# Patient Record
Sex: FEMALE | Race: WHITE | NOT HISPANIC OR LATINO | Employment: FULL TIME | URBAN - METROPOLITAN AREA
[De-identification: names, ages, dates, MRNs, and addresses within clinical notes are randomized per-mention and may not be internally consistent; named-entity substitution may affect disease eponyms.]

---

## 2019-12-03 ENCOUNTER — OFFICE VISIT (OUTPATIENT)
Dept: BEHAVIORAL HEALTH | Facility: CLINIC | Age: 49
End: 2019-12-03
Payer: COMMERCIAL

## 2019-12-03 VITALS
DIASTOLIC BLOOD PRESSURE: 100 MMHG | WEIGHT: 179 LBS | HEIGHT: 61 IN | BODY MASS INDEX: 33.79 KG/M2 | HEART RATE: 94 BPM | SYSTOLIC BLOOD PRESSURE: 107 MMHG

## 2019-12-03 DIAGNOSIS — F41.1 GENERALIZED ANXIETY DISORDER: ICD-10-CM

## 2019-12-03 DIAGNOSIS — G47.9 SLEEP DISORDER: ICD-10-CM

## 2019-12-03 PROBLEM — E03.9 HYPOTHYROIDISM: Status: ACTIVE | Noted: 2018-08-09

## 2019-12-03 PROBLEM — I10 HTN (HYPERTENSION): Status: ACTIVE | Noted: 2018-08-09

## 2019-12-03 PROCEDURE — 99204 OFFICE O/P NEW MOD 45 MIN: CPT | Mod: GC | Performed by: PSYCHIATRY & NEUROLOGY

## 2019-12-03 RX ORDER — QUETIAPINE FUMARATE 25 MG/1
25-50 TABLET, FILM COATED ORAL NIGHTLY PRN
Qty: 60 TAB | Refills: 2 | Status: SHIPPED | OUTPATIENT
Start: 2019-12-03 | End: 2020-03-10 | Stop reason: SDUPTHER

## 2019-12-03 RX ORDER — LEVOTHYROXINE SODIUM 0.05 MG/1
TABLET ORAL
Refills: 1 | COMMUNITY
Start: 2019-11-10 | End: 2020-10-14

## 2019-12-03 RX ORDER — HYDROXYZINE 50 MG/1
25-50 TABLET, FILM COATED ORAL 3 TIMES DAILY PRN
Qty: 30 TAB | Refills: 2 | Status: SHIPPED | OUTPATIENT
Start: 2019-12-03 | End: 2020-03-10 | Stop reason: SINTOL

## 2019-12-03 RX ORDER — CITALOPRAM 20 MG/1
20 TABLET ORAL
Qty: 30 TAB | Refills: 2 | Status: SHIPPED | OUTPATIENT
Start: 2019-12-03 | End: 2020-03-10 | Stop reason: SDUPTHER

## 2019-12-03 RX ORDER — BENZONATATE 100 MG/1
CAPSULE ORAL
COMMUNITY
Start: 2019-02-22 | End: 2019-12-03

## 2019-12-03 RX ORDER — LISINOPRIL AND HYDROCHLOROTHIAZIDE 12.5; 1 MG/1; MG/1
TABLET ORAL
COMMUNITY
Start: 2018-09-21 | End: 2020-05-06

## 2019-12-03 RX ORDER — LIOTHYRONINE SODIUM 5 UG/1
5 TABLET ORAL
Refills: 1 | COMMUNITY
Start: 2019-11-15

## 2019-12-03 RX ORDER — QUETIAPINE FUMARATE 25 MG/1
TABLET, FILM COATED ORAL
COMMUNITY
Start: 2019-04-11 | End: 2019-12-03 | Stop reason: SDUPTHER

## 2019-12-03 RX ORDER — LIOTHYRONINE SODIUM 5 UG/1
TABLET ORAL
COMMUNITY
Start: 2018-09-24 | End: 2019-12-03

## 2019-12-03 RX ORDER — CITALOPRAM 20 MG/1
20 TABLET ORAL
Refills: 0 | COMMUNITY
Start: 2019-11-12 | End: 2019-12-03 | Stop reason: SDUPTHER

## 2019-12-03 RX ORDER — CLONAZEPAM 0.5 MG/1
TABLET ORAL
COMMUNITY
End: 2020-03-10

## 2019-12-03 RX ORDER — MELOXICAM 7.5 MG/1
TABLET ORAL
COMMUNITY
Start: 2019-03-01 | End: 2020-10-14

## 2019-12-03 RX ORDER — GLUCOSAMINE HCL 500 MG
TABLET ORAL
COMMUNITY
End: 2019-12-03

## 2019-12-03 RX ORDER — ERGOCALCIFEROL 1.25 MG/1
CAPSULE ORAL
Refills: 1 | COMMUNITY
Start: 2019-09-24

## 2019-12-03 ASSESSMENT — PATIENT HEALTH QUESTIONNAIRE - PHQ9: CLINICAL INTERPRETATION OF PHQ2 SCORE: 0

## 2019-12-03 NOTE — PROGRESS NOTES
"  PSYCHIATRIC Evaluation:    Requesting Provider: Benja Torres D.O.  Supervising Physician: Dr. Myles  Information Obtained From: Patient    Chief Complaint:   Anxiety and sleep    ID: 49 y.o. Indian female with hx of JUANITA, panic, depression, sleep disorder and HTN, hypothyroid     HPI:   JUANITA: stable, pt states no significant daily discomfort from anxiety at this time. Pt has hx of panic attacks. Notes she has spikes in anxiety 1-2 times per week which she manages with deep breathing, walking, and occasional old PRN clonazepam Rx. Pt amenable to continue citalopram and not restarting therapy at this time. Pt amenable to trial PRN hydroxyzine for anxiety spikes. RTC 3 months, sooner if needed.    Sleep disturbance: pt has been on sleep aid for many yrs. Currently stable on Quetiapine 50 mg PO HS. Discussed risk/benefit/side effects. Pt states she would like to minimize medication and is working to lose weight. We agreed to taper and discontinue over time with referral to sleep CBT-I.         On psych ROS, pt denies depressive symptoms, denies manic symptoms, denies psychotic symptoms including AH / VH, denies OCD symptoms, denies restrictive eating or purging and denies trauma related symptoms; denies SI/HI     Depression Screen (PHQ-2/PHQ-9) 12/3/2019   PHQ-2 Total Score 0       Interpretation of PHQ-9 Total Score   Score Severity   1-4 No Depression   5-9 Mild Depression   10-14 Moderate Depression   15-19 Moderately Severe Depression   20-27 Severe Depression    Medical Review of Systems: as reported by pt. All systems reviewed.   General: sleep see HPI; states appetite and energy doing well  Cardio: hailey chest pain  Resp: denies sob  GI: denies disturbance  Psych See HPI    Medical Conditions: No past medical history on file.  TBIs: denies  SZs: denies  Strokes: denies  Thyroid: on Rx  Diabetes: denies  Cardiovascular disease: denies          Objective:  Blood pressure 107/100, pulse 94, height 1.549 m (5' 1\"), " "weight 81.2 kg (179 lb).           MSE :     Appearance: over weight  female; appears stated age; clean   Behavior/Motor: calm, cooperative, pleasant, engaged. good eye contact.  No tics. No mannerisms.   Speech : normal rate, normal volume, normal tone, no slurring of speech and speech is clear and understandable; with appropriate prosody, and inflection   Language: English with fluent and good vocabulary    Mood: \"anxious\"   Affect: euthymic, mood congruent and full range of affect   Thought Process: linear, clear, coherent; without loosening of association; without thought blocking; goal-oriented   Thought Content: no delusions, paranoia, obsessions, phobias, or overvalued ideas noted; denies intrusive thoughts   Perceptions: denies a/v hallucinations; not seen to respond to internal stimuli; not internally preoccupied   Attention/Concentration: intact to conversation; did not require reorientation to questions   Memory: no gross deficits   Orientation: alert and grossly oriented to person, place, situation, and time    Neurological: Deferred   Fund of Knowledge: appropriate to interview based on syntax   Insight/Judgment: good / good based on ability to discuss health   SI/HI: Reliably denies suicidal and homicidal ideations       Past Psychiatric Hx:   Psychiatric hospitalizations: Denies  Past suicide attempts: Denies  Previous psychiatric medications:  Ambien: felt odd on Rx  Seroquel: effective for sleep  Citalopram up to 40 mg; pt denies side effects and desires lowest effective does  Zoloft: ineffective    Family Psychiatric Hx:  NIMA: dad and brother Etoh  Denies psychosis, suicide, bipolar, depression , anxiety in family    Social Hx:  Moves often for work  Moved to [place] June 2019  Born/raised [place], [place] (family still there)  2 brothers  Works in finance  Denies  hx  Denies disability  Financies sufficient at this time  Housing stable  Legal/Violence: Patient reports no pending " legal issues    Access to Firearms: No    Drug/Alcohol/Tobacco Hx:   Drugs: Patient denies the use of the following drugs:, meth, heroin, cocaine, marijuana, LSD and shrooms   Alcohol: Patient denies the use of alcohol   Tobacco: Patient denies the use of tobacco products    MEDICAL Hx: labs, MARS, medications, etc were reviewed. Findings of potential interest to psychiatry are noted below:    No past medical history on file.      Medications:   Current Outpatient Medications   Medication Sig Dispense Refill   • clonazePAM (KLONOPIN) 0.5 MG Tab Take  by mouth.     • levothyroxine (SYNTHROID) 50 MCG Tab TAKE 1 TABLET BY ORAL ROUTE ON MONDAY -FRIDAY AND 1.5 TAB ON SATURDAY AND SUNDAY  1   • liothyronine (CYTOMEL) 5 MCG Tab Take 5 mcg by mouth every day.  1   • lisinopril-hydrochlorothiazide (PRINZIDE) 10-12.5 MG per tablet Take  by mouth.     • meloxicam (MOBIC) 7.5 MG Tab Take  by mouth.     • QUEtiapine (SEROQUEL) 25 MG Tab Take 1-2 Tabs by mouth at bedtime as needed (for sleep). 60 Tab 2   • citalopram (CELEXA) 20 MG Tab Take 1 Tab by mouth every day. 30 Tab 2   • hydrOXYzine HCl (ATARAX) 50 MG Tab Take 0.5-1 Tabs by mouth 3 times a day as needed for Itching or Anxiety. 30 Tab 2   • ergocalciferol (DRISDOL) 43329 UNIT capsule TAKE ONE CAPSULE BY MOUTH ONE TIME PER WEEK  1     No current facility-administered medications for this visit.        Allergies:   Patient has no known allergies.    Labs:   none  ECG: ECG not performed for this encounter    Imaging: No recent head, neck, or brain imaging      ASSESSMENT:  Pt is a 49 y.o. [place] female with hx of JUANITA, panic, depression, sleep disorder and HTN, hypothyroid seen in new patient evaluation. Pt has JUANITA in remission and stable panic disorder. Pt amenable to continue citalopram 20 mg po daily. Pt has old clonazepam Rx and will trial hydroxyzine PRN for panic. Pt chronic sleep disturbance for which she takes Quetiapine 50 mg PO daily; plan to taper/discontinue and  referral to sleep CBTI-.    Risk Assessment:  Acute danger to self: Low, in follow up care, no active SI or intent/plans.  Chronic danger to self: elevated due to psychiatric illness.  Danger to others: denies homicidal ideations  Grave Disability: not applicable  Guns: denies access  Emergency plan reviewed: call 911 or report to ED if suicidal. provided suicide/crisis line number    DDX/Plan:  # JUANITA in remission  Hx of panic attacks; has old clonazepam Rx  Trial hydroxyzine 25-50 mg PO TID PRN for panic  Continue citalopram 20 mg PO daily  Pt not interested in therapy at this time    #sleep disorder  Quetiapine 25-50 mg PO HS PRN for sleep  Has been taking consistently for many yrs; will start to taper and trial nights without sleep aid to see how sleep is currently  Discussed sleep aid as short term only, plan to taper and discontinue  Information for Dr. Renner for sleep CBT-I      -Discussed risks, benefits, side effects, and alternatives of recommended treatment with patient.  - f/u in 3 month(s) or sooner if needed          This note was created using voice recognition software (Dragon). The accuracy of the dictation is limited by the abilities of the software. I have reviewed the note prior to signing; however, some errors in grammar and context are still possible. If you have any questions related to this note, please do not hesitate to contact our office.     Discussed case, assessment, and plan with my attending, Dr. Myles, who was able to personally see and evaluate the patient.

## 2020-03-10 ENCOUNTER — OFFICE VISIT (OUTPATIENT)
Dept: BEHAVIORAL HEALTH | Facility: CLINIC | Age: 50
End: 2020-03-10
Payer: COMMERCIAL

## 2020-03-10 DIAGNOSIS — G47.9 SLEEP DISORDER: ICD-10-CM

## 2020-03-10 DIAGNOSIS — F41.1 GENERALIZED ANXIETY DISORDER: ICD-10-CM

## 2020-03-10 PROBLEM — Z86.59 PERSONAL HISTORY OF OTHER MENTAL AND BEHAVIORAL DISORDERS: Status: ACTIVE | Noted: 2017-02-06

## 2020-03-10 PROBLEM — E55.9 VITAMIN D DEFICIENCY: Status: ACTIVE | Noted: 2017-02-06

## 2020-03-10 PROCEDURE — 96127 BRIEF EMOTIONAL/BEHAV ASSMT: CPT | Performed by: PSYCHIATRY & NEUROLOGY

## 2020-03-10 RX ORDER — CITALOPRAM 20 MG/1
30 TABLET ORAL
Qty: 45 TAB | Refills: 2 | Status: SHIPPED | OUTPATIENT
Start: 2020-03-10 | End: 2020-05-05 | Stop reason: SDUPTHER

## 2020-03-10 RX ORDER — ACETAMINOPHEN 160 MG
TABLET,DISINTEGRATING ORAL
COMMUNITY
Start: 2017-01-19

## 2020-03-10 RX ORDER — QUETIAPINE FUMARATE 25 MG/1
25 TABLET, FILM COATED ORAL NIGHTLY PRN
Qty: 30 TAB | Refills: 2 | Status: SHIPPED | OUTPATIENT
Start: 2020-03-10 | End: 2020-05-05 | Stop reason: SDUPTHER

## 2020-03-10 RX ORDER — CHLORAL HYDRATE 500 MG
CAPSULE ORAL
COMMUNITY
Start: 2017-01-19 | End: 2020-10-14

## 2020-03-10 RX ORDER — CLONAZEPAM 0.5 MG/1
.25-1 TABLET ORAL
Qty: 10 TAB | Refills: 1 | Status: SHIPPED
Start: 2020-03-10 | End: 2020-06-09 | Stop reason: SDUPTHER

## 2020-03-10 ASSESSMENT — PATIENT HEALTH QUESTIONNAIRE - PHQ9
SUM OF ALL RESPONSES TO PHQ QUESTIONS 1-9: 5
CLINICAL INTERPRETATION OF PHQ2 SCORE: 2
5. POOR APPETITE OR OVEREATING: 0 - NOT AT ALL

## 2020-03-10 NOTE — PROGRESS NOTES
[facility]  PSYCHIATRIC FOLLOW-UP NOTE    PCP: Benja Torres D.O.  Persons in attendance: Patient  Total face-to-face time: 25 minutes    REASON FOR VISIT/CHIEF COMPLAINT (as stated by Patient):  Follow up for JUANITA and sleep     HISTORY OF PRESENT ILLNESS:    ID: 49 y.o. Kisw[place] female with hx of JUANITA, panic, depression, sleep disorder and HTN, hypothyroid      Current psychotropics:  Citalopram 20 mg daily  Old Rx of clonazepam 0.5 mg tabs (out of medication)  Hydroxyzine 25-50 mg PRN (not effective too sedating)  Seroquel 25-50 mg PRN for sleep (pt taking 25 mg PO HS)    Pt notes worsening general anxiety which she relates to work stress. Pt notes that she had increased use of clonazepam to three times weekly over the last two months from previously using none for many months. Pt reports anxiety and anger spikes where she will become very frustrated and have difficulty utilizing her coping skills to calm herself (past therapy, none current). Pt working on behavioral activation of caring for dog which she is able to take to work which she feels is helping her stay calm at work. Pt reports appetite has been down secondary to anxiety. Pt amenable to use of PRN clonazepam and titration of citalopram for better control of anxiety, and continued tapering of Seroquel 25 mg PO HS PRN for sleep  after discussed risk/benefit/side effects both short and long term and black box warnings/metobolic risk and tardive dyskinesia risk. Pt not interested in restarting therapy at this time.      PSYCHOSOCIAL CHANGES SINCE PREVIOUS CONTACT:  Work stress: boss fired, trouble with person below her, workplace harassment problems  Got small, old dog which she walks 3 times daily and takes to work  Denies substance use (father had NIMA)  Lives alone      Depression Screen (PHQ-2/PHQ-9) 12/3/2019 3/10/2020   PHQ-2 Total Score 0 2   PHQ-9 Total Score - 5       Interpretation of PHQ-9 Total Score   Score Severity   1-4 No  "Depression   5-9 Mild Depression   10-14 Moderate Depression   15-19 Moderately Severe Depression   20-27 Severe Depression  RESPONSE TO TREATMENT:  Partial remission of symptoms with citalopram for anxiety  Benefit with PRN clonazepam for anxiety spikes  Benefit without AM grogginess with Seroquel 25 mg PO HS PRN for sleep  Hydroxyzine 25-50 mg PRN was too sedating    MEDICATION SIDE EFFECTS:   denies    REVIEW OF SYSTEMS:        General: see hpi for sleep; denies problems with appetite and energy  Cardio: did not endorse chest pain; notes palpitations during anxiety increase  Respiratory: denies cough  GI: did not endorse nausea/vomiting/diarrhea/constipation  Psych: see HPI; denies depression, psychosis, shelly, hypomania, illicit substance use, alcohol use, MJ use, nicotine use, denies SI/HI      PSYCHIATRIC EXAMINATION/MENTAL STATUS  Blood pressure 117/76, pulse 95, weight 80.1 kg (176 lb 9.6 oz).    Appearance: over weight  female; appears stated age; clean              Behavior/Motor: calm, cooperative, pleasant, engaged. good eye contact.  No tics. No mannerisms.              Speech : normal rate, normal volume, normal tone, no slurring of speech and speech is clear and understandable; with appropriate prosody, and inflection              Language: English with fluent and good vocabulary               Mood: \"okay\"              Affect: euthymic, mood congruent and full range of affect              Thought Process: linear, clear, coherent; without loosening of association; without thought blocking; goal-oriented              Thought Content: no delusions, paranoia, obsessions, phobias, or overvalued ideas noted; denies intrusive thoughts              Perceptions: denies a/v hallucinations; not seen to respond to internal stimuli; not internally preoccupied              Attention/Concentration: intact to conversation; did not require reorientation to questions              Memory: no gross deficits      "         Orientation: alert and oriented to person, place, situation, and time               Neurological: Deferred              Fund of Knowledge: appropriate to interview based on syntax              Insight/Judgment: good / good based on ability to discuss health  SI/HI: Reliably denies suicidal and homicidal ideations     Medical Records/Labs/Diagnostic Tests Reviewed: none since last visit    Medical Records/Labs/Diagnostic Tests Ordered: none      ASSESSMENT:  Pt is a 49 y.o. Romansh female with hx of JUANITA, panic, depression, sleep disorder and HTN, hypothyroid seen in new patient evaluation. Pt has JUANITA active again with some panic symptoms. Pt amenable to increase citalopram 30 mg po daily. Use of Prn clonazepam (past prior benefit; no concern for misuse). Pt chronic sleep disturbance for which she takes Quetiapine 25 mg PO daily; plan to taper/discontinue and utilizing sleep hygiene.     Risk Assessment:  Acute danger to self: Low, in follow up care, no active SI or intent/plans.  Chronic danger to self: elevated due to psychiatric illness.  Danger to others: denies homicidal ideations  Grave Disability: not applicable  Guns: denies access  Emergency plan reviewed: call 911 or report to ED if suicidal. provided suicide/crisis line number    DDX/Plan:  # JUANITA in remission  Start: clonazepam 0.25-1 mg PO PRN for anxiety, #10 0.5 mg tabs for 30 days with 1 refill (has not filled controlled substance in [place],  empty)  Controlled substance contract signed   Discontinue due to ineffective hydroxyzine 25-50 mg PO TID PRN for panic  Increase citalopram 30 mg PO daily  Pt not interested in therapy at this time; has done therapy in the past    #sleep disorder  Quetiapine 25 mg PO HS PRN for sleep  Has been taking consistently for many yrs; will start to taper and trial nights without sleep aid to see how sleep is currently  Discussed sleep aid as short term only, plan to taper and discontinue  Information for   [facility] for sleep CBT-I; pt is not interested at this time  Pt improving sleep hygiene      -Discussed risks, benefits, side effects, and alternatives of recommended treatment with patient.  - f/u in 1.5-2 month(s) or sooner if needed  Discussed transition of care and physician parental leave          This note was created using voice recognition software (Dragon). The accuracy of the dictation is limited by the abilities of the software. I have reviewed the note prior to signing; however, some errors in grammar and context are still possible. If you have any questions related to this note, please do not hesitate to contact our office.     Discussed case, assessment, and plan with my attending, Dr. Myles, who was NOT able to personally see and evaluate the patient.

## 2020-03-13 VITALS
SYSTOLIC BLOOD PRESSURE: 117 MMHG | BODY MASS INDEX: 33.34 KG/M2 | HEART RATE: 95 BPM | DIASTOLIC BLOOD PRESSURE: 76 MMHG | HEIGHT: 61 IN | WEIGHT: 176.6 LBS

## 2020-05-05 ENCOUNTER — TELEMEDICINE (OUTPATIENT)
Dept: BEHAVIORAL HEALTH | Facility: CLINIC | Age: 50
End: 2020-05-05
Payer: COMMERCIAL

## 2020-05-05 VITALS — HEIGHT: 61 IN | BODY MASS INDEX: 33.04 KG/M2 | WEIGHT: 175 LBS

## 2020-05-05 DIAGNOSIS — F41.1 GENERALIZED ANXIETY DISORDER: ICD-10-CM

## 2020-05-05 DIAGNOSIS — G47.9 SLEEP DISORDER: ICD-10-CM

## 2020-05-05 PROCEDURE — 99213 OFFICE O/P EST LOW 20 MIN: CPT | Mod: GC,95,CR | Performed by: PSYCHIATRY & NEUROLOGY

## 2020-05-05 RX ORDER — CITALOPRAM 20 MG/1
30 TABLET ORAL
Qty: 45 TAB | Refills: 2 | Status: SHIPPED | OUTPATIENT
Start: 2020-05-05 | End: 2020-06-09 | Stop reason: SDUPTHER

## 2020-05-05 RX ORDER — QUETIAPINE FUMARATE 25 MG/1
25 TABLET, FILM COATED ORAL NIGHTLY PRN
Qty: 30 TAB | Refills: 2 | Status: SHIPPED | OUTPATIENT
Start: 2020-05-05 | End: 2020-06-09 | Stop reason: SDUPTHER

## 2020-05-05 NOTE — PROGRESS NOTES
[facility]  PSYCHIATRIC FOLLOW-UP NOTE      Reason for visit / chief complaint:   Chief Complaint   Patient presents with   • Anxiety       SUBJECTIVE / HPI:  Iris says that she is doing better with the citalopram increase.  She has had a difficult month as her department had to lay off people, she is a  at [employer].  She got an emotional support dog, a small Madi mix, last month who goes to work with her and has helped her a lot.  Has been feeling anxious and angry, but is feeling better now.  Used Klonopin a few times but no use in the last 3 weeks.  Sleep is okay, still taking the quetiapine 25 mg as needed on the weekdays.  Overall she says that things are going better than she expected given circumstances.  Would like to continue on her current regimen.    This encounter was conducted via Zoom .   Verbal consent was obtained. Patient's identity was verified.    Psychiatric/Medical ROS:  Depression: Denies SI, guilt/worthlessness, anhedonia, low energy      Anxiety: Denies panic attacks  Consitutional: Denies fevers/chills, weight changes  Cardiac: Denies chest pain, SOB, palpitations  GI: Denies nausea/vomiting, abdominal pain    Medications:  Current Outpatient Medications on File Prior to Visit   Medication Sig Dispense Refill   • Cholecalciferol (VITAMIN D3) 2000 UNIT Cap Take  by mouth.     • Omega-3 1000 MG Cap Take  by mouth.     • citalopram (CELEXA) 20 MG Tab Take 1.5 Tabs by mouth every day. 45 Tab 2   • QUEtiapine (SEROQUEL) 25 MG Tab Take 1 Tab by mouth at bedtime as needed (for sleep). 30 Tab 2   • levothyroxine (SYNTHROID) 50 MCG Tab TAKE 1 TABLET BY ORAL ROUTE ON MONDAY -FRIDAY AND 1.5 TAB ON SATURDAY AND SUNDAY  1   • liothyronine (CYTOMEL) 5 MCG Tab Take 5 mcg by mouth every day.  1   • lisinopril-hydrochlorothiazide (PRINZIDE) 10-12.5 MG per tablet Take  by mouth.     • meloxicam (MOBIC) 7.5 MG Tab Take  by mouth.     • ergocalciferol (DRISDOL) 07690 UNIT capsule  "TAKE ONE CAPSULE BY MOUTH ONE TIME PER WEEK  1     No current facility-administered medications on file prior to visit.          Past Medications:  Per Dr. Ennis 12/3/19: Ambien: felt odd on Rx  Seroquel: effective for sleep  Citalopram up to 40 mg; pt denies side effects and desires lowest effective does  Zoloft: ineffective  Hydroxyzine: Ineffective      Social History:  Per Dr. Ennis 12/3/19: Moves often for work  Moved to [place] June 2019  Born/raised [place], [place] (family still there)  2 brothers  Works in finance  Denies  hx  Denies disability  Financies sufficient at this time  Housing stable  Legal/Violence: Patient reports no pending legal issues      OBJECTIVE:  Height 1.549 m (5' 1\"), weight 79.4 kg (175 lb), not currently breastfeeding.      Psychiatric Examination:   Appearance:  female appropriately dressed and groomed  Behavior: Open, cooperative  Thought Content: No SI/HI, no AVH  Thought Process: Linear, goal-directed  Speech: Normal rate and rhythm  Mood: \"Good\"        Affect: Euthymic          Attention/Alertness: Attends well to interview  Orientation/Memory: Grossly intact  Insight/Judgement: Fair/fair          ASSESSMENT:  This is a 49-year-old female with history of generalized anxiety disorder and insomnia who is currently doing well despite the COVID-19 situation.  She would like to continue on her current regimen.    # JUANITA  # Sleep disorder      PLAN:  - Continue Celexa 30 mg daily  - Continue Klonopin 0.25 to 1 mg PO PRN anxiety; no refills today  - Continue quetiapine 25 mg PO QHS PRN sleep    - Counseled patient on the benefits, side effects, and alternatives to treatment prescribed above.  - RTC in 2-3 months      This note was created using voice recognition software (Dragon). The accuracy of the dictation is limited by the abilities of the software. I have reviewed the note prior to signing, however some errors in grammar and context are still possible. If you have " any questions related to this note please do not hesitate to contact our office.

## 2020-05-06 RX ORDER — LISINOPRIL 5 MG/1
TABLET ORAL
COMMUNITY
Start: 2020-04-29 | End: 2020-10-14

## 2020-05-06 RX ORDER — CLONAZEPAM 0.5 MG/1
TABLET, ORALLY DISINTEGRATING ORAL
COMMUNITY
Start: 2020-04-29 | End: 2020-10-14

## 2020-06-09 DIAGNOSIS — F41.1 GENERALIZED ANXIETY DISORDER: ICD-10-CM

## 2020-06-09 RX ORDER — QUETIAPINE FUMARATE 25 MG/1
25 TABLET, FILM COATED ORAL NIGHTLY PRN
Qty: 30 TAB | Refills: 3 | Status: SHIPPED | OUTPATIENT
Start: 2020-06-09 | End: 2020-10-14 | Stop reason: SDUPTHER

## 2020-06-09 RX ORDER — CITALOPRAM 20 MG/1
30 TABLET ORAL
Qty: 45 TAB | Refills: 3 | Status: SHIPPED | OUTPATIENT
Start: 2020-06-09 | End: 2020-10-14 | Stop reason: SDUPTHER

## 2020-06-09 RX ORDER — CLONAZEPAM 0.5 MG/1
.25-1 TABLET ORAL
Qty: 10 TAB | Refills: 1 | Status: SHIPPED | OUTPATIENT
Start: 2020-06-09 | End: 2020-07-09

## 2020-10-02 DIAGNOSIS — F41.1 GAD (GENERALIZED ANXIETY DISORDER): ICD-10-CM

## 2020-10-02 RX ORDER — CLONAZEPAM 0.5 MG/1
0.5 TABLET ORAL
Qty: 10 TAB | Refills: 0 | Status: SHIPPED | OUTPATIENT
Start: 2020-10-02 | End: 2020-11-01

## 2020-10-02 RX ORDER — ERGOCALCIFEROL 1.25 MG/1
CAPSULE ORAL
Qty: 5 CAP | Refills: 1 | OUTPATIENT
Start: 2020-10-02

## 2020-10-02 RX ORDER — CLONAZEPAM 0.5 MG/1
TABLET, ORALLY DISINTEGRATING ORAL
Qty: 90 TAB | OUTPATIENT
Start: 2020-10-02

## 2020-10-14 ENCOUNTER — TELEMEDICINE (OUTPATIENT)
Dept: BEHAVIORAL HEALTH | Facility: CLINIC | Age: 50
End: 2020-10-14
Payer: COMMERCIAL

## 2020-10-14 VITALS — BODY MASS INDEX: 30.26 KG/M2 | HEIGHT: 62 IN | WEIGHT: 164.46 LBS

## 2020-10-14 DIAGNOSIS — G47.00 INSOMNIA, UNSPECIFIED TYPE: ICD-10-CM

## 2020-10-14 DIAGNOSIS — F41.1 GENERALIZED ANXIETY DISORDER: ICD-10-CM

## 2020-10-14 PROBLEM — G47.9 SLEEP DISORDER: Status: RESOLVED | Noted: 2019-12-03 | Resolved: 2020-10-14

## 2020-10-14 PROBLEM — Z86.59 PERSONAL HISTORY OF OTHER MENTAL AND BEHAVIORAL DISORDERS: Status: RESOLVED | Noted: 2017-02-06 | Resolved: 2020-10-14

## 2020-10-14 PROCEDURE — 99214 OFFICE O/P EST MOD 30 MIN: CPT | Mod: 95,CR | Performed by: PSYCHIATRY & NEUROLOGY

## 2020-10-14 RX ORDER — CITALOPRAM 20 MG/1
30 TABLET ORAL
Qty: 135 TAB | Refills: 0 | Status: SHIPPED | OUTPATIENT
Start: 2020-10-14

## 2020-10-14 RX ORDER — QUETIAPINE FUMARATE 25 MG/1
25 TABLET, FILM COATED ORAL NIGHTLY PRN
Qty: 90 TAB | Refills: 0 | Status: SHIPPED | OUTPATIENT
Start: 2020-10-14

## 2020-10-14 RX ORDER — LISINOPRIL AND HYDROCHLOROTHIAZIDE 12.5; 1 MG/1; MG/1
TABLET ORAL
COMMUNITY
Start: 2020-09-02

## 2020-10-14 RX ORDER — LEVOTHYROXINE SODIUM 75 MCG
75 TABLET ORAL EVERY MORNING
COMMUNITY
Start: 2020-09-16

## 2020-10-14 NOTE — PROGRESS NOTES
PSYCHIATRY VIRTUAL VISIT FOLLOW-UP NOTE      Chief Complaint   Patient presents with   • Follow-Up     anxiety, sleep       This evaluation was conducted via Zoom using secure and encrypted videoconferencing technology. The patient was in a private location in the state of [place].    The patient's identity was confirmed and verbal consent was obtained for this virtual visit.    History Of Present Illness:  Iris Duarte is a 50 y.o. female with generalized anxiety disorder, hypothyroidism, hypertension comes in today for follow up, was last seen by Dr. Farnsworth over 5 months ago.  She has had some struggles with her anxiety and sleep in the last few months.  She accepted a new job and moved to [place] in July.  She has been having a hard time because of the move in the middle of the pandemic.  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  She feels that she is settling in slowly.  She does like this job better than her older job here and will be in [place] for about another 2 years.  She denies any other psychosocial stressors.  She felt good without Klonopin for a few months but ever since she moved here she has been using it maybe once a week to calm down her anxiety.  She is also using Seroquel more frequently to help with sleep.  She has noticed some slight sadness because of the pandemic but she seems to be handling it well.  She denies any significant struggles with her physical health.  She denies having thoughts of wanting to hurt herself or others.    Social History:   She is single, no kids, lives alone in [place], employed full time in finance.    Substance Use:  Alcohol - Infrequent  Nicotine - Denies  Cannabis - Denies  Illicit drugs - Denies     Past Medication Trials:  Zoloft (ineffective), Hydroxyzine (ineffective)    Medications:  Current Outpatient Medications   Medication Sig Dispense Refill   • SYNTHROID 75 MCG Tab Take 75 mcg by mouth every morning.     •  "lisinopril-hydrochlorothiazide (PRINZIDE) 10-12.5 MG per tablet      • QUEtiapine (SEROQUEL) 25 MG Tab Take 1 Tab by mouth at bedtime as needed (for sleep). 90 Tab 0   • citalopram (CELEXA) 20 MG Tab Take 1.5 Tabs by mouth every day. 135 Tab 0   • clonazePAM (KLONOPIN) 0.5 MG Tab Take 1 Tab by mouth 1 time daily as needed (anxiety) for up to 30 days. 10 Tab 0   • Cholecalciferol (VITAMIN D3) 2000 UNIT Cap Take  by mouth.     • liothyronine (CYTOMEL) 5 MCG Tab Take 5 mcg by mouth every day.  1   • ergocalciferol (DRISDOL) 22889 UNIT capsule TAKE ONE CAPSULE BY MOUTH ONE TIME PER WEEK  1     No current facility-administered medications for this visit.        Review Of Systems:    Constitutional - Positive for fatigue  Respiratory - Negative for shortness of breath, cough  CVS - Negative for chest pain, palpitations  GI - Negative for nausea, vomiting, abdominal pain, diarrhea, constipation  Musculoskeletal - Negative for back pain  Neurological - Negative for headaches  Psychiatric - Positive for anxiety, poor sleep    Physical Examination:  Vital signs: Ht 1.575 m (5' 2\")   Wt 74.6 kg (164 lb 7.4 oz)   BMI 30.08 kg/m²     Musculoskeletal: No abnormal movements.     Mental Status Evaluation:   General: Middle aged female, dressed in casual attire, good grooming and hygiene, in no apparent distress, calm and cooperative, good eye contact, no psychomotor agitation or retardation  Orientation: Alert and oriented to person, place and time  Recent and remote memory: Grossly intact  Attention span and concentration: Grossly intact  Speech: Spontaneous, normal rate, rhythm and tone  Thought Process: Linear, logical and goal directed  Thought Content: Denies suicidal or homicidal ideations, intent or plan  Perception: Denies auditory or visual hallucinations. No delusions noted  Associations: Intact  Language: Appropriate  Fund of knowledge and vocabulary: Grossly adequate  Mood: \"good\"  Affect: Anxious at times, mood " congruent  Insight: Good  Judgment: Good    Depression screening:  Depression Screen (PHQ-2/PHQ-9) 12/3/2019 3/10/2020   PHQ-2 Total Score 0 2   PHQ-9 Total Score - 5     Interpretation of PHQ-9 Total Score   Score Severity   1-4 No Depression   5-9 Mild Depression   10-14 Moderate Depression   15-19 Moderately Severe Depression   20-27 Severe Depression    Medical Records/Labs/Diagnostic Tests Reviewed:  [place]  records - appropriate refills, no abuse suspected       Impression:  1.  Generalized anxiety disorder - slight worsening   2.  Insomnia - slight worsening     Plan:  1.  Continue Celexa 30 mg daily for depression and anxiety  2.  Continue Klonopin 0.5 mg daily as needed for anxiety.  Not refilled today.  3.  Continue Seroquel 25 mg at bedtime as needed for anxiety and/or sleep  4.  I have refilled her Celexa and Seroquel for 90 days and her Klonopin was refilled last week by me.  She moved to [place] and I have advised her to establish care with PCP and Psychiatry over there and she plans on starting the process soon.  5.  Discussed her slight worsening of symptoms and she does agree that it is related to her new job, moved to [place] and ongoing pandemic and she is willing to continue her current doses for now and hoping that once she settles in her new job and new place she will feel better.    Return to clinic on as needed basis sooner if symptoms worsen    The proposed treatment plan was discussed with the patient who was provided the opportunity to ask questions and make suggestions regarding alternative treatment. Patient verbalized understanding and expressed agreement with the plan.     Karen Myles M.D.  10/14/20    This note was created using voice recognition software (Dragon). The accuracy of the dictation is limited by the abilities of the software. I have reviewed the note prior to signing, however some errors in grammar and context are still possible. If you have any questions related  to this note please do not hesitate to contact our office.

## 2024-01-14 ENCOUNTER — OFFICE VISIT (OUTPATIENT)
Age: 54
End: 2024-01-14

## 2024-01-14 VITALS
HEIGHT: 61 IN | HEART RATE: 86 BPM | WEIGHT: 190 LBS | OXYGEN SATURATION: 96 % | SYSTOLIC BLOOD PRESSURE: 111 MMHG | TEMPERATURE: 97.5 F | BODY MASS INDEX: 35.87 KG/M2 | DIASTOLIC BLOOD PRESSURE: 76 MMHG

## 2024-01-14 DIAGNOSIS — M54.42 ACUTE BILATERAL LOW BACK PAIN WITH BILATERAL SCIATICA: Primary | ICD-10-CM

## 2024-01-14 DIAGNOSIS — M54.41 ACUTE BILATERAL LOW BACK PAIN WITH BILATERAL SCIATICA: Primary | ICD-10-CM

## 2024-01-14 RX ORDER — CYCLOBENZAPRINE HCL 10 MG
10 TABLET ORAL 3 TIMES DAILY PRN
Qty: 21 TABLET | Refills: 0 | Status: SHIPPED | OUTPATIENT
Start: 2024-01-14 | End: 2024-01-24

## 2024-01-14 RX ORDER — QUETIAPINE FUMARATE 100 MG/1
100 TABLET, FILM COATED ORAL 2 TIMES DAILY
COMMUNITY

## 2024-01-14 RX ORDER — METHYLPREDNISOLONE 4 MG/1
TABLET ORAL
Qty: 1 KIT | Refills: 0 | Status: SHIPPED | OUTPATIENT
Start: 2024-01-14 | End: 2024-01-20

## 2024-01-14 RX ORDER — LEVOTHYROXINE SODIUM 0.1 MG/1
100 TABLET ORAL DAILY
COMMUNITY